# Patient Record
Sex: FEMALE | Race: WHITE
[De-identification: names, ages, dates, MRNs, and addresses within clinical notes are randomized per-mention and may not be internally consistent; named-entity substitution may affect disease eponyms.]

---

## 2018-08-14 ENCOUNTER — HOSPITAL ENCOUNTER (EMERGENCY)
Dept: HOSPITAL 11 - JP.ED | Age: 73
Discharge: HOME | End: 2018-08-14
Payer: MEDICARE

## 2018-08-14 VITALS — SYSTOLIC BLOOD PRESSURE: 103 MMHG | DIASTOLIC BLOOD PRESSURE: 85 MMHG

## 2018-08-14 DIAGNOSIS — Z88.8: ICD-10-CM

## 2018-08-14 DIAGNOSIS — I48.91: Primary | ICD-10-CM

## 2018-08-14 DIAGNOSIS — Z79.899: ICD-10-CM

## 2018-08-14 PROCEDURE — 99285 EMERGENCY DEPT VISIT HI MDM: CPT

## 2018-08-14 PROCEDURE — 96376 TX/PRO/DX INJ SAME DRUG ADON: CPT

## 2018-08-14 PROCEDURE — 93005 ELECTROCARDIOGRAM TRACING: CPT

## 2018-08-14 PROCEDURE — 96374 THER/PROPH/DIAG INJ IV PUSH: CPT

## 2018-08-14 PROCEDURE — 85025 COMPLETE CBC W/AUTO DIFF WBC: CPT

## 2018-08-14 PROCEDURE — 84484 ASSAY OF TROPONIN QUANT: CPT

## 2018-08-14 PROCEDURE — 96361 HYDRATE IV INFUSION ADD-ON: CPT

## 2018-08-14 PROCEDURE — 36415 COLL VENOUS BLD VENIPUNCTURE: CPT

## 2018-08-14 PROCEDURE — 80048 BASIC METABOLIC PNL TOTAL CA: CPT

## 2018-08-14 PROCEDURE — 92960 CARDIOVERSION ELECTRIC EXT: CPT

## 2018-08-14 NOTE — PCM.PRNOTE
- Free Text/Narrative


Note: 





Date of service: 8/14/2018





Proposed procedure: Synchronized cardioversion


Preprocedure diagnosis: Paroxysmal atrial fibrillation with rapid ventricular 

response


Post procedure diagnosis: Paroxysmal atrial fibrillation with rapid ventricular 

response





Indication for procedure: uJju was evaluated today for management atrial 

fibrillation/atrial flutter with symptoms and/or rapid ventricular response.  

Synchronized cardioversion was recommended as a primary treatment.





Description of the procedure: Juju is currently located [facility].  We have 

reviewed the potential risks of electrical cardioversion including but not 

limited to: Superficial skin burns, ineffective treatment, other arrhythmias, 

reaction to anesthesia medications or potentially asystole.  The benefits of 

the procedure have also been reviewed.  At this time the patient wishes to 

proceed with electrical cardioversion.  All necessary pre-procedure information 

and paperwork has been provided and completed, respectively.  The patient was 

connected to cardioversion pads and monitoring equipment per protocol.  Prior 

to the procedure, a timeout was held with nursing and Dr Griffin present to 

confirm the right patient and right procedure.  Once appropriate anesthesia was 

administered the machine was charged to 200 Joules and a synchronized 

electrical shock was applied.  The patient was successfully converted to normal 

sinus rhythm with a few PAC's based on telemetry monitoring.  They will remain 

in their current location until anesthesia has dissipated and the patient is 

more awake and alert.  They will then be discharged to home once medically 

stable.  Anticoagulation should be continued for at least one month post 

cardioversion.  There were no immediate complications noted from the procedure. 

Post procedure EKG is pending at the time of dictation. 





Alhaji Rodriguez M.D.

## 2018-08-14 NOTE — EDM.PDOC
ED HPI GENERAL MEDICAL PROBLEM





- General


Chief Complaint: Cardiovascular Problem


Stated Complaint: AFIB? SOB


Time Seen by Provider: 08/14/18 09:30


Source of Information: Reports: Patient


History Limitations: Reports: No Limitations





- History of Present Illness


INITIAL COMMENTS - FREE TEXT/NARRATIVE: 





73-year-old female with a history of paroxysmal atrial fibrillation, is on 

Xarelto but only has had a few episodes in the past few years that has resolved 

spontaneously. She's had palpitations over the past 4 days, which slowly 

developing shortness of breath with activity and anxiety about her persistent 

arrhythmia. She noticed that she could see her pulse" in her neck this morning" 

which scared her so she came in to be seen. She denies any chest pain, nausea 

vomiting, diaphoresis. She has not missed any of her medications.


Duration: Day(s): (She thinks symptoms have been ongoing for the past 4 days)


Severity: Moderate





- Related Data


 Allergies











Allergy/AdvReac Type Severity Reaction Status Date / Time


 


lisinopril AdvReac  Cough Verified 08/14/18 09:47











Home Meds: 


 Home Meds





Atenolol [Tenormin] 25 mg PO BID 10/09/14 [History]


Rivaroxaban [Xarelto] 20 mg PO DAILY #30 tablet 10/10/14 [Rx]











ED ROS GENERAL





- Review of Systems


Review Of Systems: See Below


Constitutional: Denies: Fever, Chills, Malaise, Decreased Appetite


HEENT: Reports: No Symptoms


Respiratory: Reports: Shortness of Breath


Cardiovascular: Reports: Palpitations.  Denies: Chest Pain


GI/Abdominal: Denies: Diarrhea, Nausea, Vomiting


: Reports: No Symptoms


Musculoskeletal: Reports: Neck Pain (Has been complaining of some posterior 

neck pain and right shoulder discomfort, she thinks it's related to "stress".)


Neurological: Reports: Headache (Mild intermittent headache)


Psychiatric: Reports: No Symptoms





ED EXAM, GENERAL





- Physical Exam


Exam: See Below


Exam Limited By: No Limitations


General Appearance: Alert, No Apparent Distress, Anxious


Eye Exam: Bilateral Eye: Normal Inspection


Head: Atraumatic


Neck: Supple


Respiratory/Chest: No Respiratory Distress, Lungs Clear


Cardiovascular: No Murmur, Tachycardia, Irregularly Irregular


GI/Abdominal: Soft, Non-Tender


Extremities: Pedal Edema (Patient has a trace to 1+ symmetric ankle edema)


Neurological: Alert, Oriented


Psychiatric: Normal Affect, Normal Mood


Skin Exam: Warm, Dry





EKG INTERPRETATION


EKG Date: 08/14/18


Time: 09:45


Rhythm: A-Fib


Rate (Beats/Min): 128


QRS: Normal





Course





- Vital Signs


Last Recorded V/S: 


 Last Vital Signs











Temp  97.3 F   08/14/18 09:46


 


Pulse  111 H  08/14/18 10:36


 


Resp  17   08/14/18 10:36


 


BP  103/85   08/14/18 10:36


 


Pulse Ox  92 L  08/14/18 10:36














- Orders/Labs/Meds


Orders: 


 Active Orders 24 hr











 Category Date Time Status


 


 EKG Documentation Completion [RC] ASDIRECTED Care  08/14/18 09:37 Active


 


 EKG 12 Lead [EK] Routine Ther  08/14/18 09:36 Ordered











Labs: 


 Laboratory Tests











  08/14/18 08/14/18 Range/Units





  09:41 09:41 


 


WBC  8.3   (4.5-11.0)  K/uL


 


RBC  4.20   (3.30-5.50)  M/uL


 


Hgb  13.5   (12.0-15.0)  g/dL


 


Hct  40.4   (36.0-48.0)  %


 


MCV  96   (80-98)  fL


 


MCH  32 H   (27-31)  pg


 


MCHC  33   (32-36)  %


 


Plt Count  227   (150-400)  K/uL


 


Neut % (Auto)  78 H   (36-66)  %


 


Lymph % (Auto)  13 L   (24-44)  %


 


Mono % (Auto)  7 H   (2-6)  %


 


Eos % (Auto)  1 L   (2-4)  %


 


Baso % (Auto)  0   (0-1)  %


 


Sodium   140  (140-148)  mmol/L


 


Potassium   4.6  (3.6-5.2)  mmol/L


 


Chloride   104  (100-108)  mmol/L


 


Carbon Dioxide   27  (21-32)  mmol/L


 


Anion Gap   9.1  (5.0-14.0)  mmol/L


 


BUN   8  (7-18)  mg/dL


 


Creatinine   0.9  (0.6-1.0)  mg/dL


 


Est Cr Clr Drug Dosing   51.59  mL/min


 


Estimated GFR (MDRD)   > 60  (>60)  


 


Glucose   115 H  ()  mg/dL


 


Calcium   9.0  (8.5-10.1)  mg/dL


 


Troponin I   < 0.017  (0.000-0.056)  ng/mL











Meds: 


Medications














Discontinued Medications














Generic Name Dose Route Start Last Admin





  Trade Name Rohit  PRN Reason Stop Dose Admin


 


Diltiazem HCl  20 mg  08/14/18 09:36  08/14/18 09:50





  Diltiazem  IVPUSH  08/14/18 09:37  20 mg





  ONETIME ONE   Administration





     





     





     





     


 


Diltiazem HCl  20 mg  08/14/18 10:21  08/14/18 10:27





  Diltiazem  IVPUSH  08/14/18 10:22  20 mg





  ONETIME ONE   Administration





     





     





     





     


 


Sodium Chloride  1,000 mls @ 250 mls/hr  08/14/18 09:45  08/14/18 09:50





  Normal Saline  IV   250 mls/hr





  ASDIRECTED RHONDA   Administration





     





     





     





     


 


Propofol  100 mg  08/14/18 10:41  08/14/18 10:51





  Diprivan  20 Ml  IVPUSH  08/14/18 10:42  100 mg





  ONETIME ONE   Administration





     





     





     





     














- Re-Assessments/Exams


Free Text/Narrative Re-Assessment/Exam: 





08/14/18 10:11


EKG confirmed atrial fibrillation with rapid ventricular response. An IV was 

started, patient was given 20 mg of IV Cardizem and 250 mL an hour of normal 

saline. CBC, BMP and troponin were obtained. The patient doesn't spontaneously 

convert in the next hour, we'll consider cardioversion.


08/14/18 10:40


Patient's rate was slowed with 20 mg of Cardizem IV, this was repeated after a 

half an hour after the effect was wearing off. After his second dose was 

unsuccessful with persistent rate control or spontaneous cardioversion, after 

discussing the risks and benefits of cardioversion the patient agreed to 

synchronized cardioversion. With the assistance of Dr. Rodriguez of the 

hospitalist service this was attempted under propofol sedation.


08/14/18 11:33


After synchronized cardioversion the patient converted to normal sinus rhythm. 

There was some irritability initially but she settled down to a normal rhythm 

at a pulse of 70. Copies of her 2 EKGs pre and post cardioversion along with 

her labs were given to the patient, and she can recheck with her cardiologist 

in the near future discuss medication changes or recheck if symptoms recur.





Departure





- Departure


Time of Disposition: 12:01


Disposition: Home, Self-Care 01


Condition: Good


Clinical Impression: 


 Afib, Atrial fibrillation





Instructions:  Atrial Fibrillation, Easy-to-Read


Referrals: 


Irma Guan PA-C [Primary Care Provider] - 


Forms:  ED Department Discharge


Care Plan Goals: 


Continue your current medications, and recheck with your cardiologist to 

discuss your problems with atrial fibrillation especially if symptoms recur.





- My Orders


Last 24 Hours: 


My Active Orders





08/14/18 09:36


EKG 12 Lead [EK] Routine 





08/14/18 09:37


EKG Documentation Completion [RC] ASDIRECTED 














- Assessment/Plan


Last 24 Hours: 


My Active Orders





08/14/18 09:36


EKG 12 Lead [EK] Routine 





08/14/18 09:37


EKG Documentation Completion [RC] ASDIRECTED

## 2019-07-19 ENCOUNTER — HOSPITAL ENCOUNTER (OUTPATIENT)
Dept: HOSPITAL 11 - JP.SDS | Age: 74
Discharge: HOME | End: 2019-07-19
Attending: SURGERY
Payer: MEDICARE

## 2019-07-19 VITALS — DIASTOLIC BLOOD PRESSURE: 66 MMHG | SYSTOLIC BLOOD PRESSURE: 113 MMHG | HEART RATE: 64 BPM

## 2019-07-19 DIAGNOSIS — I10: ICD-10-CM
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
## 2019-07-19 DIAGNOSIS — S80.12XA: Primary | ICD-10-CM

## 2019-07-19 DIAGNOSIS — F17.200: ICD-10-CM

## 2019-07-19 DIAGNOSIS — Z88.8: ICD-10-CM

## 2019-07-19 DIAGNOSIS — E78.00: ICD-10-CM

## 2019-07-19 DIAGNOSIS — J44.9: ICD-10-CM

## 2019-07-19 DIAGNOSIS — Z86.73: ICD-10-CM

## 2019-07-19 PROCEDURE — 36415 COLL VENOUS BLD VENIPUNCTURE: CPT

## 2019-07-19 PROCEDURE — 85027 COMPLETE CBC AUTOMATED: CPT

## 2019-07-19 PROCEDURE — 80048 BASIC METABOLIC PNL TOTAL CA: CPT

## 2019-07-19 PROCEDURE — 10140 I&D HMTMA SEROMA/FLUID COLLJ: CPT

## 2019-07-19 RX ADMIN — FENTANYL CITRATE ONE MCG: 50 INJECTION, SOLUTION INTRAMUSCULAR; INTRAVENOUS at 09:03

## 2019-07-19 RX ADMIN — FENTANYL CITRATE ONE MCG: 50 INJECTION, SOLUTION INTRAMUSCULAR; INTRAVENOUS at 09:21

## 2019-07-19 NOTE — OR
DATE OF PROCEDURE:  07/19/2019

 

SURGEON:  Ranjith Lugo MD

 

PROCEDURE:  Hematoma evacuation, right leg.

 

COMPLICATION:  None.

 

ASSISTANT:  None.

 

FINDINGS:  Solid and liquid hematoma contents consistent with approximately a 12-cm

hematoma.

 

RISKS:  Risks, benefits, alternatives, and limitations including, but not limited to

infection, bleeding, and DVT formation were explained to the patient, who wished to proceed.

 

PROCEDURE IN DETAIL:  The patient was placed in supine position.  The hematoma was readily

identified.  It was anesthetized with 1% lidocaine.  A single nick was created in the skin.

This was mechanically broken up using a Jessica clamp.  At no time was the Jessica forced or

advanced.  Once this was completed, approximately 1 L of irrigation was used to remove the

remaining hematoma contents.

 

A 10 flat Deandre-Ng drain was then placed within the wound.  This was then closed with

interrupted Vicryl sutures.  The drain was then sutured into place.  The patient tolerated

the procedure well.

 

 

 

 

Ranjith Lugo MD

DD:  07/19/2019 10:00:43

DT:  07/19/2019 11:35:42

Job #:  [id number]

## 2020-02-12 NOTE — EDM.PDOC
ED HPI GENERAL MEDICAL PROBLEM





- General


Chief Complaint: Lower Extremity Injury/Pain


Stated Complaint: LT LEG PAIN/SWELLING


Time Seen by Provider: 02/12/20 17:26


Source of Information: Reports: Patient, Family, RN Notes Reviewed


History Limitations: Reports: No Limitations





- History of Present Illness


INITIAL COMMENTS - FREE TEXT/NARRATIVE: 





75-year-old female presents emergency department a complaint of left leg pain 

predominantly in the calf she states has had this pain for 3 days denies any 

trauma she does have a history of TIA for which she takes Xarelto she is 

concerned she may have a blood clot.


  ** Left Lower Leg


Pain Score (Numeric/FACES): 6





- Related Data


 Allergies











Allergy/AdvReac Type Severity Reaction Status Date / Time


 


lisinopril AdvReac  Cough Verified 02/12/20 16:47











Home Meds: 


 Home Meds





Rivaroxaban [Xarelto] 20 mg PO DAILY #30 tablet 10/10/14 [Rx]


Amiodarone [Cordarone] 200 mg PO DAILY 07/18/19 [History]


Cholecalciferol (Vitamin D3) [Vitamin D3] 1,000 units PO DAILY 07/18/19 [History

]


Diltiazem [Cardizem CD] 180 mg PO DAILY 07/18/19 [History]


Furosemide 20 mg PO DAILY PRN 07/18/19 [History]


Losartan [Cozaar] 50 mg PO DAILY 07/18/19 [History]


Metoprolol Succinate [Toprol XL 100mg] 100 mg PO DAILY 07/18/19 [History]


Pravastatin Sodium 10 mg PO DAILY 07/18/19 [History]











Past Medical History


HEENT History: Reports: Impaired Vision


Cardiovascular History: Reports: Afib, Hypertension


OB/GYN History: Reports: Pregnancy


Neurological History: Reports: TIA


Psychiatric History: Reports: Addiction





- Infectious Disease History


Infectious Disease History: Reports: Chicken Pox





- Past Surgical History


Cardiovascular Surgical History: Reports: Varicose, Other (See Below)


Other Cardiovascular Surgeries/Procedures: mitral valve repair


GI Surgical History: Reports: Colonoscopy





Social & Family History





- Family History


Family Medical History: Noncontributory





- Tobacco Use


Smoking Status *Q: Current Every Day Smoker


Years of Tobacco use: 50


Packs/Tins Daily: 0.5


Used Tobacco, but Quit: No


Second Hand Smoke Exposure: Yes





- Caffeine Use


Caffeine Use: Reports: Coffee





- Alcohol Use


Days Per Week of Alcohol Use: 7


Number of Drinks Per Day: 4


Total Drinks Per Week: 28





- Recreational Drug Use


Recreational Drug Use: No





Review of Systems





- Review of Systems


Review Of Systems: See Below


Constitutional: Reports: No Symptoms


Respiratory: Reports: No Symptoms


Cardiovascular: Reports: No Symptoms


Musculoskeletal: Reports: Leg Pain


Skin: Reports: No Symptoms





ED EXAM, GENERAL





- Physical Exam


Exam: See Below


Free Text/Narrative:: 





Examination of the lower extremities I do appreciate a slight difference in 

size left greater than right no erythema she is tender to palpation over the 

left calf but it is not edematous, she does elicit pain with dorsiflexion


Exam Limited By: No Limitations


General Appearance: Alert, WD/WN, No Apparent Distress


Respiratory/Chest: No Respiratory Distress





Course





- Vital Signs


Last Recorded V/S: 


 Last Vital Signs











Temp  97.9 F   02/12/20 16:46


 


Pulse  75   02/12/20 16:46


 


Resp  16   02/12/20 16:46


 


BP  147/73 H  02/12/20 16:46


 


Pulse Ox  95   02/12/20 16:46














- Orders/Labs/Meds


Labs: 


 Laboratory Tests











  02/12/20 Range/Units





  17:37 


 


D-Dimer, Quantitative  < 100  (0.0-400.0)  ng/mL














Departure





- Departure


Time of Disposition: 18:13


Disposition: Home, Self-Care 01


Condition: Fair


Clinical Impression: 


 Left leg pain








- Discharge Information


Referrals: 


Irma Guan PA-C [Primary Care Provider] - 


Forms:  ED Department Discharge


Additional Instructions: 


Continue with your regular medications, follow-up with your primary care in the 

next 3 to 5 days if not better, call return to the emergency department 

worsening of symptoms





Sepsis Event Note





- Evaluation


Sepsis Screening Result: No Definite Risk





- Focused Exam


Vital Signs: 


 Vital Signs











  Temp Pulse Resp BP Pulse Ox


 


 02/12/20 16:46  97.9 F  75  16  147/73 H  95


 


 02/12/20 16:25  97.9 F  75  16  147/73 H  95











Date Exam was Performed: 02/12/20


Time Exam was Performed: 18:12





- Assessment/Plan


Plan: 





Assessment





Acuity = acute





Site and laterality = left leg pain





Etiology  = suspicious for muscle skeletal cause





Manifestations = none





Location of injury =  Home





Lab values = d-dimer is negative





Plan


Follow-up with primary care next 3 to 5 days if not better

















 This note was dictated using dragon voice recognition software please call 

with any questions on syntax or grammar.